# Patient Record
Sex: MALE | Race: WHITE | URBAN - METROPOLITAN AREA
[De-identification: names, ages, dates, MRNs, and addresses within clinical notes are randomized per-mention and may not be internally consistent; named-entity substitution may affect disease eponyms.]

---

## 2023-03-21 ENCOUNTER — APPOINTMENT (RX ONLY)
Dept: URBAN - METROPOLITAN AREA CLINIC 15 | Facility: CLINIC | Age: 68
Setting detail: DERMATOLOGY
End: 2023-03-21

## 2023-03-21 DIAGNOSIS — L738 OTHER SPECIFIED DISEASES OF HAIR AND HAIR FOLLICLES: ICD-10-CM | Status: INADEQUATELY CONTROLLED

## 2023-03-21 DIAGNOSIS — L663 OTHER SPECIFIED DISEASES OF HAIR AND HAIR FOLLICLES: ICD-10-CM | Status: INADEQUATELY CONTROLLED

## 2023-03-21 DIAGNOSIS — L73.9 FOLLICULAR DISORDER, UNSPECIFIED: ICD-10-CM | Status: INADEQUATELY CONTROLLED

## 2023-03-21 PROBLEM — L02.424 FURUNCLE OF LEFT UPPER LIMB: Status: ACTIVE | Noted: 2023-03-21

## 2023-03-21 PROBLEM — L02.426 FURUNCLE OF LEFT LOWER LIMB: Status: ACTIVE | Noted: 2023-03-21

## 2023-03-21 PROBLEM — L02.425 FURUNCLE OF RIGHT LOWER LIMB: Status: ACTIVE | Noted: 2023-03-21

## 2023-03-21 PROBLEM — L02.423 FURUNCLE OF RIGHT UPPER LIMB: Status: ACTIVE | Noted: 2023-03-21

## 2023-03-21 PROCEDURE — ? PRESCRIPTION

## 2023-03-21 PROCEDURE — ? MEDICATION COUNSELING

## 2023-03-21 PROCEDURE — 99203 OFFICE O/P NEW LOW 30 MIN: CPT

## 2023-03-21 PROCEDURE — ? COUNSELING

## 2023-03-21 PROCEDURE — ? PRESCRIPTION MEDICATION MANAGEMENT

## 2023-03-21 RX ORDER — CLINDAMYCIN PHOSPHATE 10 MG/ML
LOTION TOPICAL
Qty: 60 | Refills: 1 | Status: ERX | COMMUNITY
Start: 2023-03-21

## 2023-03-21 RX ADMIN — CLINDAMYCIN PHOSPHATE: 10 LOTION TOPICAL at 00:00

## 2023-03-21 ASSESSMENT — LOCATION DETAILED DESCRIPTION DERM
LOCATION DETAILED: LEFT POSTERIOR ANKLE
LOCATION DETAILED: RIGHT VENTRAL PROXIMAL FOREARM
LOCATION DETAILED: LEFT ANTERIOR DISTAL UPPER ARM
LOCATION DETAILED: RIGHT POSTERIOR ANKLE
LOCATION DETAILED: RIGHT ANTERIOR PROXIMAL THIGH
LOCATION DETAILED: LEFT ANTERIOR PROXIMAL THIGH

## 2023-03-21 ASSESSMENT — LOCATION SIMPLE DESCRIPTION DERM
LOCATION SIMPLE: LEFT ANKLE
LOCATION SIMPLE: LEFT UPPER ARM
LOCATION SIMPLE: RIGHT THIGH
LOCATION SIMPLE: LEFT THIGH
LOCATION SIMPLE: RIGHT FOREARM
LOCATION SIMPLE: RIGHT ANKLE

## 2023-03-21 ASSESSMENT — LOCATION ZONE DERM
LOCATION ZONE: LEG
LOCATION ZONE: ARM

## 2023-03-21 ASSESSMENT — BSA RASH: BSA RASH: 1

## 2023-03-21 NOTE — PROCEDURE: MEDICATION COUNSELING
[facility] Pregnancy And Lactation Text: This medication is Pregnancy Category D and is not considered safe during pregnancy.  The risk during breast feeding is also uncertain.

## 2023-03-21 NOTE — PROCEDURE: MEDICATION COUNSELING
Subjective:       Patient ID: David Amaro is a 72 y.o. male.    Chief Complaint: Hernia    HPI  Past Medical History:   Diagnosis Date    Arthritis     Cancer     prostate    CKD (chronic kidney disease)     Dyslipidemia     HTN (hypertension)     Hyperlipidemia     Hypothyroidism     Prostate cancer     Thyroid disease      Past Surgical History:   Procedure Laterality Date    COLONOSCOPY N/A 4/3/2017    [facility]    COLONOSCOPY N/A 4/24/2014    [facility]    HERNIA REPAIR  2009 approx    seed implants in prostate      TONSILLECTOMY       Family History   Problem Relation Age of Onset    Stroke Mother     Cancer Mother     Colon cancer Mother     Diabetes Father     Cancer Sister         lung    Cancer Brother     Bone cancer Brother     Heart disease Maternal Grandmother      Social History     Socioeconomic History    Marital status:      Spouse name: Not on file    Number of children: Not on file    Years of education: Not on file    Highest education level: Not on file   Social Needs    Financial resource strain: Not on file    Food insecurity - worry: Not on file    Food insecurity - inability: Not on file    Transportation needs - medical: Not on file    Transportation needs - non-medical: Not on file   Occupational History    Not on file   Tobacco Use    Smoking status: Never Smoker    Smokeless tobacco: Never Used   Substance and Sexual Activity    Alcohol use: No    Drug use: No    Sexual activity: Yes     Partners: Female   Other Topics Concern    Not on file   Social History Narrative    Not on file     Review of Systems   Constitutional: Negative for activity change, appetite change, chills, diaphoresis, fatigue, fever and unexpected weight change.   HENT: Negative for drooling, ear discharge, ear pain, facial swelling, hearing loss, mouth sores, nosebleeds, postnasal drip, rhinorrhea,  sinus pressure, sneezing, sore throat, tinnitus, trouble swallowing and voice change.    Eyes: Negative for photophobia, redness and visual disturbance.   Respiratory: Negative for apnea, cough, choking, chest tightness, shortness of breath and wheezing.    Cardiovascular: Negative for chest pain, palpitations and leg swelling.   Gastrointestinal: Negative for abdominal distention, abdominal pain, anal bleeding, blood in stool, constipation, diarrhea, nausea and vomiting.   Endocrine: Negative for cold intolerance, heat intolerance, polydipsia, polyphagia and polyuria.   Genitourinary: Negative for difficulty urinating, dysuria, enuresis, flank pain, frequency, genital sores, hematuria and urgency.        Right groin hernia-----   Musculoskeletal: Negative for arthralgias, back pain, gait problem, joint swelling, myalgias, neck pain and neck stiffness.   Skin: Positive for rash. Negative for color change, pallor and wound.   Allergic/Immunologic: Negative for food allergies and immunocompromised state.   Neurological: Negative for dizziness, tremors, seizures, syncope, facial asymmetry, speech difficulty, weakness, light-headedness, numbness and headaches.   Hematological: Negative for adenopathy. Does not bruise/bleed easily.   Psychiatric/Behavioral: Negative for agitation, behavioral problems, confusion, decreased concentration, dysphoric mood, hallucinations, self-injury, sleep disturbance and suicidal ideas. The patient is not nervous/anxious and is not hyperactive.        Objective:      Physical Exam   Constitutional: He is oriented to person, place, and time. He appears well-developed and well-nourished. No distress.   HENT:   Head: Normocephalic and atraumatic.   Eyes: Pupils are equal, round, and reactive to light.   Neck: Normal range of motion. Neck supple. No JVD present. Carotid bruit is not present. No tracheal deviation present. No thyromegaly present.   Cardiovascular: Normal rate, regular rhythm,  normal heart sounds and intact distal pulses.   Pulmonary/Chest: Effort normal and breath sounds normal. No respiratory distress. He has no wheezes. He has no rales. He exhibits no tenderness.   Abdominal: Soft. Bowel sounds are normal. He exhibits no distension. There is no tenderness. There is no rebound and no guarding.   Reducible right inguinal hernia----   Musculoskeletal: Normal range of motion. He exhibits no edema or tenderness.   Lymphadenopathy:     He has no cervical adenopathy.   Neurological: He is alert and oriented to person, place, and time.   Skin: Skin is warm and dry. No rash noted. He is not diaphoretic. No erythema. No pallor.   Psychiatric: He has a normal mood and affect. His behavior is normal. Judgment and thought content normal.   Nursing note and vitals reviewed.      CMP  Sodium   Date Value Ref Range Status   01/15/2019 140 136 - 145 mmol/L Final     Potassium   Date Value Ref Range Status   01/15/2019 4.2 3.5 - 5.1 mmol/L Final     Chloride   Date Value Ref Range Status   01/15/2019 109 95 - 110 mmol/L Final     CO2   Date Value Ref Range Status   01/15/2019 24 23 - 29 mmol/L Final     Glucose   Date Value Ref Range Status   01/15/2019 88 70 - 110 mg/dL Final     BUN, Bld   Date Value Ref Range Status   01/15/2019 19 8 - 23 mg/dL Final     Creatinine   Date Value Ref Range Status   01/15/2019 1.3 0.5 - 1.4 mg/dL Final     Calcium   Date Value Ref Range Status   01/15/2019 9.4 8.7 - 10.5 mg/dL Final     Total Protein   Date Value Ref Range Status   11/14/2018 6.8 6.0 - 8.4 g/dL Final     Albumin   Date Value Ref Range Status   01/15/2019 3.8 3.5 - 5.2 g/dL Final     Total Bilirubin   Date Value Ref Range Status   11/14/2018 0.4 0.1 - 1.0 mg/dL Final     Comment:     For infants and newborns, interpretation of results should be based  on gestational age, weight and in agreement with clinical  observations.  Premature Infant recommended reference ranges:  Up to 24 hours.............<8.0  mg/dL  Up to 48 hours............<12.0 mg/dL  3-5 days..................<15.0 mg/dL  6-29 days.................<15.0 mg/dL       Alkaline Phosphatase   Date Value Ref Range Status   11/14/2018 84 55 - 135 U/L Final     AST   Date Value Ref Range Status   11/14/2018 22 10 - 40 U/L Final     ALT   Date Value Ref Range Status   11/14/2018 18 10 - 44 U/L Final     Anion Gap   Date Value Ref Range Status   01/15/2019 7 (L) 8 - 16 mmol/L Final     eGFR if    Date Value Ref Range Status   01/15/2019 >60.0 >60 mL/min/1.73 m^2 Final     eGFR if non    Date Value Ref Range Status   01/15/2019 54.5 (A) >60 mL/min/1.73 m^2 Final     Comment:     Calculation used to obtain the estimated glomerular filtration  rate (eGFR) is the CKD-EPI equation.        Lab Results   Component Value Date    WBC 6.35 01/15/2019    HGB 12.7 (L) 01/15/2019    HCT 40.2 01/15/2019    MCV 95 01/15/2019     01/15/2019     Lab Results   Component Value Date    CHOL 219 (H) 05/16/2018     Lab Results   Component Value Date    HDL 75 05/16/2018     Lab Results   Component Value Date    LDLCALC 135.4 05/16/2018     Lab Results   Component Value Date    TRIG 43 05/16/2018     Lab Results   Component Value Date    CHOLHDL 34.2 05/16/2018     Lab Results   Component Value Date    TSH 2.361 05/16/2018     Lab Results   Component Value Date    HGBA1C 5.7 12/20/2005     Assessment:       1. Unilateral inguinal hernia without obstruction or gangrene, recurrence not specified    2. Skin lesion        Plan:   Unilateral inguinal hernia without obstruction or gangrene, recurrence not specified  -     Ambulatory referral to General Surgery    Skin lesion-call if persists.  -     clotrimazole-betamethasone 1-0.05% (LOTRISONE) cream; Apply topically 2 (two) times daily.  Dispense: 15 g; Refill: 0    f/u as scheduled.   [facility] Pregnancy And Lactation Text: It is unknown if this medication can cause problems during pregnancy and breastfeeding.

## 2023-03-21 NOTE — PROCEDURE: PRESCRIPTION MEDICATION MANAGEMENT
Render In Strict Bullet Format?: No
Detail Level: Zone
Initiate Treatment: Clindamycin 1 % lotion\\nApply to affected areas on the arms and legs twice daily until clear then as needed.
Discontinue Regimen: OTC Benadryl and cortisone

## 2023-05-02 NOTE — PROCEDURE: MEDICATION COUNSELING
Initial (On Arrival) Low Dose Naltrexone Counseling- I discussed with the patient the potential risks and side effects of low dose naltrexone including but not limited to: more vivid dreams, headaches, nausea, vomiting, abdominal pain, fatigue, dizziness, and anxiety.